# Patient Record
Sex: FEMALE | Race: BLACK OR AFRICAN AMERICAN | NOT HISPANIC OR LATINO | Employment: FULL TIME | URBAN - METROPOLITAN AREA
[De-identification: names, ages, dates, MRNs, and addresses within clinical notes are randomized per-mention and may not be internally consistent; named-entity substitution may affect disease eponyms.]

---

## 2017-02-13 ENCOUNTER — OFFICE VISIT (OUTPATIENT)
Dept: OBSTETRICS AND GYNECOLOGY | Facility: CLINIC | Age: 27
End: 2017-02-13
Payer: MEDICAID

## 2017-02-13 VITALS
SYSTOLIC BLOOD PRESSURE: 104 MMHG | DIASTOLIC BLOOD PRESSURE: 70 MMHG | HEIGHT: 67 IN | BODY MASS INDEX: 26.4 KG/M2 | WEIGHT: 168.19 LBS

## 2017-02-13 DIAGNOSIS — R10.2 FEMALE PELVIC PAIN: ICD-10-CM

## 2017-02-13 DIAGNOSIS — N94.6 DYSMENORRHEA: Primary | ICD-10-CM

## 2017-02-13 PROCEDURE — 99999 PR PBB SHADOW E&M-EST. PATIENT-LVL II: CPT | Mod: PBBFAC,,, | Performed by: OBSTETRICS & GYNECOLOGY

## 2017-02-13 PROCEDURE — 99212 OFFICE O/P EST SF 10 MIN: CPT | Mod: PBBFAC,PO | Performed by: OBSTETRICS & GYNECOLOGY

## 2017-02-13 PROCEDURE — 99214 OFFICE O/P EST MOD 30 MIN: CPT | Mod: S$PBB,,, | Performed by: OBSTETRICS & GYNECOLOGY

## 2017-02-13 NOTE — MR AVS SNAPSHOT
"    [facility]  [address] [place] [address]  Phone: [phone]                  Keren Mireles   2017 9:20 AM   Office Visit    Description:  Female : 1990   Provider:  Matty Izaguirre MD   Department:  [facility]           Reason for Visit     ER follow up- ov cyst     Dysmenorrhea                To Do List           Goals (5 Years of Data)     None      [facility]     [facility] On Call Nurse Care Line -  Assistance  Registered nurses in the [facility] On Call Center provide clinical advisement, health education, appointment booking, and other advisory services.  Call for this free service at [phone].             Medications           Message regarding Medications     Verify the changes and/or additions to your medication regime listed below are the same as discussed with your clinician today.  If any of these changes or additions are incorrect, please notify your healthcare provider.             Verify that the below list of medications is an accurate representation of the medications you are currently taking.  If none reported, the list may be blank. If incorrect, please contact your healthcare provider. Carry this list with you in case of emergency.           Current Medications     gabapentin (NEURONTIN) 100 MG capsule Take 1 capsule (100 mg total) by mouth 3 (three) times daily.    ibuprofen (ADVIL,MOTRIN) 600 MG tablet Take 1 tablet (600 mg total) by mouth every 6 (six) hours as needed for Pain.    ondansetron (ZOFRAN) 4 MG tablet Take 1 tablet (4 mg total) by mouth every 6 (six) hours.           Clinical Reference Information           Your Vitals Were     BP Height Weight Last Period BMI    104/70 5' 7" (1.702 m) 76.3 kg (168 lb 3.4 oz) 2017 26.35 kg/m2      Blood Pressure          Most Recent Value    BP  104/70      Allergies as of 2017     No Known Allergies      Immunizations Administered on Date of Encounter - " 2/13/2017     None      Language Assistance Services     ATTENTION: Language assistance services are available, free of charge. Please call [phone].      ATENCIÓN: Si habla claude, tiene a cornell disposición servicios gratuitos de asistencia lingüística. Llame al [phone].     CHÚ Ý: N?u b?n nói Ti?ng Vi?t, có các d?ch v? h? tr? ngôn ng? mi?n phí dành cho b?n. G?i s? [phone].         [facility] complies with applicable Federal civil rights laws and does not discriminate on the basis of race, color, national origin, age, disability, or sex.

## 2017-02-13 NOTE — PROGRESS NOTES
"Chief Complaint   Patient presents with    ER follow up- ov cyst    Dysmenorrhea       History of Present Illness   26 y.o. -American Female [id number] presents today for follow up from ER, RLQ pelvic pains on and off x 1 week, overall improving. No bowel or bladder complaints. Low grade fever in ER. Tried seasonique for Birth control - irregular bleeding, counseled. Recommended oral contraceptive pills to prevent ovarian cysts and improve dysmenorrhea. Agreed, no contraindications. Reports painful menses missing work last two months.       Past medical and surgical history reviewed.   I have reviewed the patient's medical history in detail and updated the computerized patient record.    Review of patient's allergies indicates:  No Known Allergies      Review of Systems - Negative except HPI  GEN ROS: negative for - chills or fever  Breast ROS: negative for breast lumps  Genito-Urinary ROS: no dysuria, trouble voiding, or hematuria      Physical Examination:  Visit Vitals    /70    Ht 5' 7" (1.702 m)    Wt 76.3 kg (168 lb 3.4 oz)    LMP 2017    BMI 26.35 kg/m2    Constitutional: She is oriented to person, place, and time. She appears well-developed and well-nourished. No distress.   HENT:   Head: Normocephalic and atraumatic.   Eyes: Conjunctivae and EOM are normal. No scleral icterus.   Neck: Normal range of motion. Neck supple. No tracheal deviation present.   Cardiovascular: Normal rate.    Pulmonary/Chest: Effort normal. No respiratory distress. She exhibits no tenderness.  Abdominal: Soft. She exhibits no distension and no mass. There is diffuce tenderness. There is no rebound and no guarding. no costovertebral angle tenderness   Genitourinary:    External rectal exam shows no thrombosed external hemorrhoids.    Pelvic exam was performed with patient supine.   No labial fusion.   There is no rash, lesion or injury on the right labia.   There is no rash, lesion or injury on the left " labia.   No bleeding and no signs of injury around the vaginal introitus, urethra is without lesions and well supported. The cervix is visualized with no discharge, lesions or friability.   No vaginal discharge found.    No significant Cystocele, Enterocele or rectocele, and uterus well supported.   Bimanual exam:   The urethra is normal to palpation and there are no palpable vaginal wall masses.   Uterus is not deviated, not enlarged, not fixed, normal shape and tender.   Cervix exhibits tenderness.    Right adnexum displays no mass and tenderness.   Left adnexum displays no mass and tenderness.  Musculoskeletal: Normal range of motion.   Lymphadenopathy: No inguinal adenopathy present.   Neurological: She is alert and oriented to person, place, and time. Coordination normal.   Skin: Skin is warm and dry. She is not diaphoretic.   Psychiatric: She has a normal mood and affect.          CT ABDOMEN PELVIS WITH CONTRAST    Indication:  right lower abd pain      Technique: Axial imaging of the abdomen and pelvis was performed after administration of IV contrast.  Dynamic and delayed images were obtained. Automated exposure control (AEC) was used to limit the radiation dose to the patient. Oral contrast was not administered for this study.    Total DLP for the study is approximately 818 mGy-cm.    Findings:   The lack of oral contrast degrades evaluation of the bowel. The appendix has maximal diameter of 7 mm. There are no significant associated inflammatory changes. The colon is grossly unremarkable. The rectum is within normal limits.  The stomach and small bowel are within normal limits. The liver, gallbladder, pancreas, spleen, adrenal glands and kidneys are within normal limits.    There is free fluid in both sides of the pelvis. There is an enhancing 21 mm lesion in the right adnexa. Faint enhancement is seen on the left in the region of the left ovary. The urinary bladder is within normal limits. There is likely  a fundal leiomyoma.    There is no significant lung base that mildly. There is no acute body wall abnormality.   Impression     1. There is free fluid in the pelvis. Enhancing foci in both sides of the pelvis are probably ovarian. These may reflect hemorrhagic cyst or other ovarian cyst. Please correlate with cervical motion tenderness. Inflammatory processes not excluded.  2. The appendix is at the upper limits of normal in size. There are no associated inflammatory changes.  3. Limited valuation the bowel secondary to the lack of oral contrast.               Assessment:  Diffuse abdominal and pelvic tenderness- mild  Dysmenorrhea - counseled.   Ovulatory pains  Undesired fertility    Plan:  Start oral contraceptive pills   counseled on fertility  Follow up 2-3 months for annual check up as scheduled.

## 2017-02-14 ENCOUNTER — TELEPHONE (OUTPATIENT)
Dept: OBSTETRICS AND GYNECOLOGY | Facility: CLINIC | Age: 27
End: 2017-02-14

## 2017-02-14 RX ORDER — ETHYNODIOL DIACETATE AND ETHINYL ESTRADIOL 1 MG-35MCG
1 KIT ORAL DAILY
Qty: 28 TABLET | Refills: 12 | Status: SHIPPED | OUTPATIENT
Start: 2017-02-14 | End: 2019-06-28

## 2017-02-14 NOTE — TELEPHONE ENCOUNTER
Patient was in office yesterday and was told we would send a birthcontrol into the pharmacy. Please review chart.

## 2017-02-14 NOTE — TELEPHONE ENCOUNTER
----- Message from Hodan Gant sent at 2/14/2017  2:52 PM CST -----  Please send birth control into [facility] pharmacy.  Please call when called in, [phone]

## 2021-05-10 ENCOUNTER — PATIENT MESSAGE (OUTPATIENT)
Dept: RESEARCH | Facility: HOSPITAL | Age: 31
End: 2021-05-10

## 2023-08-17 ENCOUNTER — TELEPHONE (OUTPATIENT)
Dept: OBSTETRICS AND GYNECOLOGY | Facility: CLINIC | Age: 33
End: 2023-08-17

## 2023-08-17 PROBLEM — N83.201 CYSTS OF BOTH OVARIES: Status: ACTIVE | Noted: 2023-08-17

## 2023-08-17 PROBLEM — N83.202 CYSTS OF BOTH OVARIES: Status: ACTIVE | Noted: 2023-08-17

## 2023-08-17 NOTE — TELEPHONE ENCOUNTER
Appt scheduled for 8- at 10 am with . Follow up from hospital stay,  saw patient at [facility].

## 2023-08-17 NOTE — TELEPHONE ENCOUNTER
----- Message from Joyce Nguyen sent at 8/17/2023  2:44 PM CDT -----  Regarding: appt access  Type:  Sooner Appointment Request    Caller is requesting a sooner appointment.  Caller declined first available appointment listed below.  Caller will not accept being placed on the waitlist and is requesting a message be sent to doctor.    Name of Caller:  vini/ gemma   When is the first available appointment?  10/3   Symptoms:  ed f/u cyst  1wk   Best Call Back Number:  [phone]  Additional Information:  requesting a appt and call back asap.. call back the patient with appt information  please advise thank you

## 2023-08-21 ENCOUNTER — PATIENT MESSAGE (OUTPATIENT)
Dept: RESEARCH | Facility: HOSPITAL | Age: 33
End: 2023-08-21

## 2023-08-22 ENCOUNTER — OFFICE VISIT (OUTPATIENT)
Dept: OBSTETRICS AND GYNECOLOGY | Facility: CLINIC | Age: 33
End: 2023-08-22

## 2023-08-22 VITALS
DIASTOLIC BLOOD PRESSURE: 90 MMHG | HEIGHT: 67 IN | SYSTOLIC BLOOD PRESSURE: 122 MMHG | WEIGHT: 230.63 LBS | BODY MASS INDEX: 36.2 KG/M2

## 2023-08-22 DIAGNOSIS — N83.201 CYSTS OF BOTH OVARIES: Primary | ICD-10-CM

## 2023-08-22 DIAGNOSIS — N83.202 CYSTS OF BOTH OVARIES: Primary | ICD-10-CM

## 2023-08-22 DIAGNOSIS — D64.9 ANEMIA, UNSPECIFIED TYPE: ICD-10-CM

## 2023-08-22 PROCEDURE — 99213 OFFICE O/P EST LOW 20 MIN: CPT | Mod: 57,S$PBB,, | Performed by: OBSTETRICS & GYNECOLOGY

## 2023-08-22 PROCEDURE — 99213 OFFICE O/P EST LOW 20 MIN: CPT | Mod: PBBFAC,PN | Performed by: OBSTETRICS & GYNECOLOGY

## 2023-08-22 PROCEDURE — 99999 PR PBB SHADOW E&M-EST. PATIENT-LVL III: ICD-10-PCS | Mod: PBBFAC,,, | Performed by: OBSTETRICS & GYNECOLOGY

## 2023-08-22 PROCEDURE — 99213 PR OFFICE/OUTPT VISIT, EST, LEVL III, 20-29 MIN: ICD-10-PCS | Mod: 57,S$PBB,, | Performed by: OBSTETRICS & GYNECOLOGY

## 2023-08-22 PROCEDURE — 99999 PR PBB SHADOW E&M-EST. PATIENT-LVL III: CPT | Mod: PBBFAC,,, | Performed by: OBSTETRICS & GYNECOLOGY

## 2023-08-22 RX ORDER — MUPIROCIN 20 MG/G
OINTMENT TOPICAL
Status: CANCELLED | OUTPATIENT
Start: 2023-08-22

## 2023-08-22 RX ORDER — SODIUM CHLORIDE 9 MG/ML
INJECTION, SOLUTION INTRAVENOUS CONTINUOUS
Status: CANCELLED | OUTPATIENT
Start: 2023-08-22

## 2023-08-22 NOTE — PROGRESS NOTES
ADMISSION H&P:  2023      Chief complaint: ovarian cysts      Indications for Surgery: large bilateral ovarian cysts      History of present illness:  Keren Mireles 32 y.o.  -American Female with acute onset pelvic pain 2 weeks ago, large bilateral ovarian cysts noted on u/s - negative tumor markers. Treated for UTI - pain improved, counseled on options, Risks, Benefits and Alternatives to laparoscopic ovarian cystectomy / possible oophorectomy / possible laparotomy, discused with patient in detail, all questions answered and patient agreed to proceed.          Ultrasound 2023:  FINDINGS:  Uterus measures 10.2 x 5.6 x 5.4 cm.  Endometrium measures 0.7 cm.  Right ovary measures 5.2 x 4.1 x 3.9 cm.  Left ovary measures 8.4 x 6.4 x 7.3 cm.  Normal vascular flow is noted to the ovaries.  There is complex septated right ovarian cyst with internal echoes measuring 3.7 by 3.1 x 2.9 cm.  There is complex septated left ovarian cyst measuring 9.0 x 7.2 x 6.0 cm.  Differential would include hemorrhagic cysts, tubo-ovarian abscesses, cystic neoplasm.  There is free fluid within the posterior cul-de-sac.         CT scan 2023:  FINDINGS:  The lung bases are clear.  The liver, gallbladder, pancreas, spleen, and adrenal glands are unremarkable.  Kidneys demonstrate no hydronephrosis or obstructing calculus.  There is simple left renal cyst, benign finding requiring no follow-up.  There is no bowel obstruction.  The appendix is normal.  There is small fat containing periumbilical hernia present.  The abdominal aorta is not aneurysmal.  There is complex septated left ovarian cystic lesion measuring approximately 8.2 x 11.3 x 10.5 cm.  There is complex septated right ovarian cystic lesion measuring approximately 5 x 4 cm.  Urinary bladder appears unremarkable.  There is no intraperitoneal free air, free fluid, or lymphadenopathy identified.  No acute displaced fractures identified.  Tiny 6  mm lucency within the L4 vertebral body is unchanged from 2017 and most compatible with tiny hemangioma.      Allergies: Review of patient's allergies indicates:  No Known Allergies    Past Medical History:   Diagnosis Date    Anxiety        Past Surgical History:   Procedure Laterality Date     SECTION         MEDS:   Current Outpatient Medications on File Prior to Visit   Medication Sig Dispense Refill    doxycycline (VIBRAMYCIN) 100 MG Cap Take 1 capsule (100 mg total) by mouth 2 (two) times daily. for 10 days 20 capsule 0    cefdinir (OMNICEF) 250 mg/5 mL suspension Take 6.5 mLs by mouth 2 (two) times a day.      HYDROcodone-acetaminophen (NORCO) 5-325 mg per tablet Take 1 tablet by mouth every 4 (four) hours as needed for Pain. (Patient not taking: Reported on 2023) 20 tablet 0    ibuprofen (ADVIL,MOTRIN) 800 MG tablet Take 1 tablet (800 mg total) by mouth 3 (three) times daily. (Patient not taking: Reported on 2023) 30 tablet 0     No current facility-administered medications on file prior to visit.       OB History          2    Para   2    Term   2       0    AB   0    Living   2         SAB   0    IAB   0    Ectopic   0    Multiple   0    Live Births                     Social History     Socioeconomic History    Marital status: Single   Tobacco Use    Smoking status: Never   Substance and Sexual Activity    Alcohol use: No     Alcohol/week: 0.0 standard drinks of alcohol    Drug use: No     Social Determinants of Health     Financial Resource Strain: Medium Risk (2023)    Overall Financial Resource Strain (CARDIA)     Difficulty of Paying Living Expenses: Somewhat hard   Food Insecurity: Food Insecurity Present (2023)    Hunger Vital Sign     Worried About Running Out of Food in the Last Year: Sometimes true     Ran Out of Food in the Last Year: Sometimes true   Transportation Needs: No Transportation Needs (2023)    PRAPARE - Transportation     Lack of  "Transportation (Medical): No     Lack of Transportation (Non-Medical): No   Physical Activity: Insufficiently Active (8/19/2023)    Exercise Vital Sign     Days of Exercise per Week: 3 days     Minutes of Exercise per Session: 40 min   Stress: No Stress Concern Present (8/19/2023)    [place] [employer] - Occupational Stress Questionnaire     Feeling of Stress : Only a little   Social Connections: Moderately Isolated (8/19/2023)    Social Connection and Isolation Panel [NHANES]     Frequency of Communication with Friends and Family: Three times a week     Frequency of Social Gatherings with Friends and Family: Never     Attends [employer] Services: More than 4 times per year     Active Member of Clubs or Organizations: No     Attends Club or Organization Meetings: Never     Marital Status: Never    Housing Stability: Unknown (8/19/2023)    Housing Stability Vital Sign     Unable to Pay for Housing in the Last Year: No     Unstable Housing in the Last Year: No       Family History   Problem Relation Age of Onset    Cancer Father     Cancer Mother          Review of System:   General: no chills, fever, night sweats, weight gain or weight loss  Psychological: no depression or suicidal ideation  Breasts: no new or changing breast lumps, nipple discharge or masses.  Respiratory: no cough, shortness of breath, or wheezing  Cardiovascular: no chest pain or dyspnea on exertion  Gastrointestinal: no abdominal pain, change in bowel habits, or black or bloody stools  Genito-Urinary: no incontinence, urinary frequency/urgency or vulvar/vaginal symptoms, or abnormal vaginal bleeding.  Musculoskeletal: no gait disturbance or muscular weakness            Physical Exam:  Vital signs: BP (!) 122/90   Ht 5' 7" (1.702 m)   Wt 104.6 kg (230 lb 9.6 oz)   LMP 08/03/2023 (Exact Date)   BMI 36.12 kg/m²    Constitutional: She appears alert and responsive. She appears well-developed, well-groomed, and " well-nourished. No distress. OverWeight   HENT:   Head: Normocephalic and atraumatic.   Eyes: Conjunctivae and EOM are normal. No scleral icterus.   Neck: Symmetrical. Normal range of motion. Neck supple. No tracheal deviation present.  Cardiovascular: Normal rate, no rhythm abnormality noted. Extremities without swelling or edema, warm.    Pulmonary/Chest: Normal respiratory Effort. No distress or retractions. She exhibits no tenderness.  Abdominal: Soft. She exhibits no distension, hernias or masses. There is no tenderness. No enlargement of liver edge or spleen.  There is no rebound and no guarding.   Genitourinary: patient refused today, painful  Musculoskeletal: Normal range of motion.   Neurological: She is alert and oriented to person, place, and time. Coordination normal.   Skin: Skin is warm and dry. She is not diaphoretic. No rashes, lesions or ulcers.   Psychiatric: She has a normal mood and affect, oriented to person, place, and time.      LABS:  Component Ref Range & Units 5 d ago   CA 19-9 <=35 U/mL <2      Component Ref Range & Units 5 d ago   CEA 0.0 - 5.0 ng/mL 0.8      Component Ref Range & Units 5 d ago   AFP 0.0 - 8.4 ng/mL 2.2      Component Ref Range & Units 5 d ago    0 - 30 U/mL 21    H/h= 8.3/28.2      Assessment:  Large cystic pelvic ovaries- normal tumor markers  Anemia - may need transfusion prior to discharge.    Plan:  Laparoscopy, USO/possible BSO, ovarian cystectomy - [facility]   Plan ovarian conservation if possible / possible transfusion        Matty Izaguirre M.D., FACOG

## 2023-10-09 ENCOUNTER — TELEPHONE (OUTPATIENT)
Dept: OBSTETRICS AND GYNECOLOGY | Facility: CLINIC | Age: 33
End: 2023-10-09

## 2023-10-09 ENCOUNTER — CLINICAL SUPPORT (OUTPATIENT)
Dept: OBSTETRICS AND GYNECOLOGY | Facility: CLINIC | Age: 33
End: 2023-10-09

## 2023-10-09 DIAGNOSIS — R39.9 UTI SYMPTOMS: Primary | ICD-10-CM

## 2023-10-09 LAB
BILIRUBIN, UA POC OHS: NEGATIVE
BLOOD, UA POC OHS: ABNORMAL
CLARITY, UA POC OHS: CLEAR
COLOR, UA POC OHS: YELLOW
GLUCOSE, UA POC OHS: NEGATIVE
KETONES, UA POC OHS: NEGATIVE
LEUKOCYTES, UA POC OHS: ABNORMAL
NITRITE, UA POC OHS: NEGATIVE
PH, UA POC OHS: 6
PROTEIN, UA POC OHS: 100
SPECIFIC GRAVITY, UA POC OHS: >=1.03
UROBILINOGEN, UA POC OHS: 0.2

## 2023-10-09 PROCEDURE — 87077 CULTURE AEROBIC IDENTIFY: CPT | Performed by: OBSTETRICS & GYNECOLOGY

## 2023-10-09 PROCEDURE — 99999PBSHW POCT URINALYSIS(INSTRUMENT): Mod: PBBFAC,,,

## 2023-10-09 PROCEDURE — 87086 URINE CULTURE/COLONY COUNT: CPT | Performed by: OBSTETRICS & GYNECOLOGY

## 2023-10-09 PROCEDURE — 87186 SC STD MICRODIL/AGAR DIL: CPT | Performed by: OBSTETRICS & GYNECOLOGY

## 2023-10-09 PROCEDURE — 99999 PR PBB SHADOW E&M-EST. PATIENT-LVL I: CPT | Mod: PBBFAC,,,

## 2023-10-09 PROCEDURE — 99999PBSHW POCT URINALYSIS(INSTRUMENT): ICD-10-PCS | Mod: PBBFAC,,,

## 2023-10-09 PROCEDURE — 87088 URINE BACTERIA CULTURE: CPT | Performed by: OBSTETRICS & GYNECOLOGY

## 2023-10-09 PROCEDURE — 81003 URINALYSIS AUTO W/O SCOPE: CPT | Mod: PBBFAC,PN

## 2023-10-09 PROCEDURE — 99211 OFF/OP EST MAY X REQ PHY/QHP: CPT | Mod: PBBFAC,PN

## 2023-10-09 PROCEDURE — 99999 PR PBB SHADOW E&M-EST. PATIENT-LVL I: ICD-10-PCS | Mod: PBBFAC,,,

## 2023-10-09 NOTE — TELEPHONE ENCOUNTER
Pt called wanting to discuss UA results, discussed results to pt. Informed pt that since she had protein in her urine that we sent it off for culture and once those results come back we will give her a call  Pt asked if her UTI will interfere with her upcoming sx  Informed pt that it will not interfere with upcoming sx  Pt verb understanding

## 2023-10-09 NOTE — TELEPHONE ENCOUNTER
----- Message from Nilsa Ruiz sent at 10/9/2023 10:59 AM CDT -----  Regarding: blood in urine  Contact: pt  Type:  Needs Medical Advice    Who Called: pt    Symptoms (please be specific): blood in urine, mild pain    Would the patient rather a call back or a response via [facility]? Call back    Best Call Back Number: [phone]    Additional Information: sts she went for pre op this morning and she has a lot of blood in her urine and was advised to let Dr Izaguirre know--please advise

## 2023-10-09 NOTE — TELEPHONE ENCOUNTER
Spoke with patient. She states she is having some burning with  urination. Informed nurse visit for today to rule out Uti, patient verb understanding.

## 2023-10-09 NOTE — TELEPHONE ENCOUNTER
----- Message from Jose Tineo sent at 10/9/2023  4:13 PM CDT -----  Contact: Self  Type:  Test Results    Who Called:  Patient  Name of Test (Lab/Mammo/Etc):  UA  Date of Test:  10/9  Ordering Provider:  Dmitriy  Where the test was performed:  [facility]  Best Call Back Number:  [phone]   Additional Information:

## 2023-10-09 NOTE — PROGRESS NOTES
Patient in clinic to leave urine sample for testing of UTI symptoms. Sample collected, will call patient with results, patient verb understanding.

## 2023-10-11 ENCOUNTER — PATIENT MESSAGE (OUTPATIENT)
Dept: OBSTETRICS AND GYNECOLOGY | Facility: CLINIC | Age: 33
End: 2023-10-11

## 2023-10-11 LAB
BACTERIA UR CULT: ABNORMAL
BACTERIA UR CULT: ABNORMAL

## 2023-10-11 RX ORDER — NITROFURANTOIN 25; 75 MG/1; MG/1
100 CAPSULE ORAL 2 TIMES DAILY
Qty: 20 CAPSULE | Refills: 0 | Status: SHIPPED | OUTPATIENT
Start: 2023-10-11 | End: 2023-10-21

## 2023-10-12 PROBLEM — N80.9 ENDOMETRIOSIS: Status: ACTIVE | Noted: 2023-10-12

## 2023-10-19 ENCOUNTER — TELEPHONE (OUTPATIENT)
Dept: OBSTETRICS AND GYNECOLOGY | Facility: CLINIC | Age: 33
End: 2023-10-19

## 2023-10-19 NOTE — TELEPHONE ENCOUNTER
----- Message from Vero Durham sent at 10/19/2023  9:39 AM CDT -----  Contact: patient  Type: Needs Medical Advice  Who Called:  patient  Best Call Back Number: [phone] (home)   Additional Information: patient needs a dr note from surgery and covering the two weeks until the follow up appt

## 2023-10-30 ENCOUNTER — OFFICE VISIT (OUTPATIENT)
Dept: OBSTETRICS AND GYNECOLOGY | Facility: CLINIC | Age: 33
End: 2023-10-30

## 2023-10-30 VITALS
WEIGHT: 223.75 LBS | HEIGHT: 66 IN | BODY MASS INDEX: 35.96 KG/M2 | SYSTOLIC BLOOD PRESSURE: 126 MMHG | DIASTOLIC BLOOD PRESSURE: 86 MMHG

## 2023-10-30 DIAGNOSIS — N83.202 CYSTS OF BOTH OVARIES: Primary | ICD-10-CM

## 2023-10-30 DIAGNOSIS — N83.201 CYSTS OF BOTH OVARIES: Primary | ICD-10-CM

## 2023-10-30 DIAGNOSIS — N80.103 ENDOMETRIOSIS OF BOTH OVARIES: ICD-10-CM

## 2023-10-30 DIAGNOSIS — N94.6 DYSMENORRHEA: ICD-10-CM

## 2023-10-30 PROCEDURE — 99999 PR PBB SHADOW E&M-EST. PATIENT-LVL III: ICD-10-PCS | Mod: PBBFAC,,, | Performed by: OBSTETRICS & GYNECOLOGY

## 2023-10-30 PROCEDURE — 99999 PR PBB SHADOW E&M-EST. PATIENT-LVL III: CPT | Mod: PBBFAC,,, | Performed by: OBSTETRICS & GYNECOLOGY

## 2023-10-30 PROCEDURE — 99024 POSTOP FOLLOW-UP VISIT: CPT | Mod: ,,, | Performed by: OBSTETRICS & GYNECOLOGY

## 2023-10-30 PROCEDURE — 99213 OFFICE O/P EST LOW 20 MIN: CPT | Mod: PBBFAC,PN | Performed by: OBSTETRICS & GYNECOLOGY

## 2023-10-30 PROCEDURE — 99024 PR POST-OP FOLLOW-UP VISIT: ICD-10-PCS | Mod: ,,, | Performed by: OBSTETRICS & GYNECOLOGY

## 2023-10-30 RX ORDER — NORETHINDRONE ACETATE AND ETHINYL ESTRADIOL .03; 1.5 MG/1; MG/1
1 TABLET ORAL DAILY
Qty: 90 EACH | Refills: 0 | Status: SHIPPED | OUTPATIENT
Start: 2023-10-30 | End: 2023-12-29 | Stop reason: SDUPTHER

## 2023-10-30 NOTE — PROGRESS NOTES
History of Present Illness:   Pateint presents today  2 weeks postop, status post :  OPERATION:   1. Laparoscopic left salpingo-oophorectomy, partial distal right salpingectomy  2. Ureterolysis/enterolysis x 45 min  3. Cystoscopy  , with complaint of none - feeling fine.    Pathology:   Path- left oophorectomy 10/12/2023  LEFT OVARY AND TUBE, SALPINGO-OOPHORECTOMY:   - Cystic endometrioma.   - Ovarian stroma with simple serous cystadenoma, simple follicular    cysts, corpora albicantia, and a hemorrhagic corpus luteum.   - Ovarian serosal adhesions.   - Simple paratubal cyst.   - Unremarkable left fallopian tube.     Past medical and surgical history reviewed.   I have reviewed the patient's medical history in detail and updated the computerized patient record.    Physical exam:  Vital Signs: as above, reviewed.  Constitutional: She is oriented to person, place, and time. She appears well-developed and well-nourished. No distress.   HENT:   Head: Normocephalic and atraumatic.   Eyes: Conjunctivae and EOM are normal. No scleral icterus.   Neck: Normal range of motion. Neck supple. No tracheal deviation present.   Cardiovascular: Normal rate.    Pulmonary/Chest: Effort normal. No respiratory distress. She exhibits no tenderness.  Breasts: deferred  Abdominal: Soft. She exhibits no distension and no mass.  The incisions are healing well. There is no rebound and no guarding.   Genitourinary: Deferred  Musculoskeletal: Normal range of motion.   Lymphadenopathy: No inguinal adenopathy present.   Neurological: She is alert and oriented to person, place, and time. Coordination normal.   Skin: Skin is warm and dry. She is not diaphoretic.   Psychiatric: She has a normal mood and affect.    Assessment:  Stage IV endometriosis - doing well s/p left oophorectomy / endometriosis excision    Plan:  Follow up  3 months  oral contraceptive pills trial for suppression x 3 months, if no better try orlissa

## 2023-12-29 ENCOUNTER — TELEPHONE (OUTPATIENT)
Dept: OBSTETRICS AND GYNECOLOGY | Facility: CLINIC | Age: 33
End: 2023-12-29

## 2023-12-29 NOTE — TELEPHONE ENCOUNTER
----- Message from Sonal Washington sent at 12/29/2023  2:15 PM CST -----  Contact: pt  Type:  RX Refill Request    Who Called: pt    Refill or New Rx:refill    RX Name and Strength:norethindrone ac-eth estradioL (LOESTRIN 1.5/30, 21,) 1.5-30 mg-mcg Tab    How is the patient currently taking it? (ex. 1XDay):as directed    Is this a 30 day or 90 day RX:90    Preferred Pharmacy with phone number:  [id number] - [place] [address] [address] [place] [address] & [facility] [address] [place] [address] [facility] [address] [place] [id number]  Phone: [phone] Fax: [phone]      Local or Mail Order:local    Ordering Provider:Dmitriy    Would the patient rather a call back or a response via [facility]? Call back    Best Call Back Number:[phone]    Additional Information: please refill script  Thanks

## 2024-01-02 RX ORDER — NORETHINDRONE ACETATE AND ETHINYL ESTRADIOL .03; 1.5 MG/1; MG/1
1 TABLET ORAL DAILY
Qty: 90 EACH | Refills: 0 | Status: SHIPPED | OUTPATIENT
Start: 2024-01-02 | End: 2025-01-01

## 2024-04-28 RX ORDER — NORETHINDRONE ACETATE AND ETHINYL ESTRADIOL 1.5; .03 MG/1; MG/1
1 TABLET ORAL
Qty: 84 EACH | Refills: 1 | Status: SHIPPED | OUTPATIENT
Start: 2024-04-28

## 2024-04-29 NOTE — TELEPHONE ENCOUNTER
Refill Authorization Note     Refill Decision Note   Ashleytawanna Mireles  is requesting a refill authorization.  Brief Assessment and Rationale for Refill:  Approve     Medication Therapy Plan:       Medication Reconciliation Completed: No   Comments:     No Care Gaps recommended.     Note composed:8:03 PM 04/28/2024

## 2024-10-22 RX ORDER — NORETHINDRONE ACETATE AND ETHINYL ESTRADIOL 1.5; .03 MG/1; MG/1
1 TABLET ORAL
Qty: 84 TABLET | Refills: 0 | Status: SHIPPED | OUTPATIENT
Start: 2024-10-22

## 2024-10-23 NOTE — TELEPHONE ENCOUNTER
Refill Decision Note   Keren Mireles  is requesting a refill authorization.  Brief Assessment and Rationale for Refill:  Approve     Medication Therapy Plan:         Comments:     Note composed:10:00 PM 10/22/2024

## 2025-01-15 RX ORDER — NORETHINDRONE ACETATE AND ETHINYL ESTRADIOL 1.5; .03 MG/1; MG/1
1 TABLET ORAL
Qty: 84 TABLET | Refills: 0 | Status: SHIPPED | OUTPATIENT
Start: 2025-01-15

## 2025-01-15 NOTE — TELEPHONE ENCOUNTER
Refill Decision Note   Keren Mireles  is requesting a refill authorization.  Brief Assessment and Rationale for Refill:  Approve     Medication Therapy Plan:         Comments:     Note composed:2:54 PM 01/15/2025